# Patient Record
Sex: FEMALE | Race: WHITE | NOT HISPANIC OR LATINO | Employment: UNEMPLOYED | URBAN - METROPOLITAN AREA
[De-identification: names, ages, dates, MRNs, and addresses within clinical notes are randomized per-mention and may not be internally consistent; named-entity substitution may affect disease eponyms.]

---
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
## 2019-01-21 RX ORDER — TOBRAMYCIN 0.3 %
OINTMENT (GRAM) OPHTHALMIC (EYE) DAILY
Refills: 0 | COMMUNITY
Start: 2018-12-24 | End: 2019-03-01 | Stop reason: ALTCHOICE

## 2019-01-21 NOTE — PROGRESS NOTES
[facility] [facility]  PATIENT:  Shea Curry  MRN:  [id number]  :  2000  DATE OF SERVICE:  2019      Assessment/Plan:        Problem List Items Addressed This Visit        Cardiovascular and Mediastinum    Chronic migraine without aura without status migrainosus, not intractable    Relevant Medications    topiramate (TOPAMAX) 100 mg tablet       Nervous and Auditory    Intracranial hypertension - Primary          Intracranial hypertension  Acetazolamide 750 mg three times a day  - Goal for weight loss    Chronic migraine headaches:   Preventive:   - Consider adding Lamictal for mood and headache  - Cyproheptadine for weather headaches  You take this before bed to prevent a headache the next day  Use as needed  - Topamax 150 mg at bedtime for 7 days then 200 mg after that    Geo Ricketts therapy:   - cambia at onset        History of Present Illness: We had the pleasure of evaluating Shea Curry in neurological follow up today  As you know she is a 22year old right handed female  Currently in community college doing her prerequisite, and will be going to [employer] program in the fall  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    PREVENTIVE: Topamax (which she does not think it really helped), Protriptyline, Propranolol, diamox  ABORTIVE: Motrin which does help, has tried Excedrin, Tylenol, Reglan, Toradol, cambia, indomethacin, ibuprofen (almost daily)    Headache trigger: Stress, Menstruation, Sunlight, Loud noises    Current pain level- 5-6/10    How often do the headaches occur -  Mild headaches: 5 a month  Severe headaches:  2-5 a month or less    What time of the day do the headaches start - sometime she wakes up with them or by mid day she will get it  How long do the headaches last - usually all day    Where are they located - bilateral temporal, bilateral occipital    What is the intensity of pain -   Mild headaches: average pain level 3-5/10, up to 8-10/10  Describe your usual headache - Throbbing, Pressure, Aching    Associated symptoms:  -  Blurry vision  - Nausea and vomiting  - Photophobia, phonophobia, blurred vision  - flushing of face bilaterally  - light-headed or dizzy, stiff or sore neck,   -  prefer to be alone and in a dark room                01/08/2016 MRI head without contrast:   IMPRESSION-   1  Given headaches in the context of slit-like appearance of the ventricles and patient's BMI of 29, consideration should be given to early idiopathic intracranial hypertension  Ophthalmology consultation, MRV, and possibly lumbar puncture may be performed if clinically indicated  2  Non-specific single small left anterior frontal focus of T2 signal hyperintensity  3  Small bilateral maxillary sinus mucosal retention cysts      Personally reviewed her MRI head    Past Medical History:   Diagnosis Date    Dysmenorrhea     Last Assessed:6/26/17    Functional ovarian cysts     Menorrhagia with regular cycle     Migraine     Ovarian cyst     Last Assessed:2/17/15       Patient Active Problem List   Diagnosis    Functional ovarian cysts    Intracranial hypertension    Chronic migraine without aura without status migrainosus, not intractable       Medications:      Current Outpatient Prescriptions   Medication Sig Dispense Refill    acetaZOLAMIDE (DIAMOX) 250 mg tablet Take 3 tablets (750 mg total) by mouth 3 (three) times a day 3 tabs in am, 3 tabs mid day and 2 tabs at bedtime 270 tablet 5    Diclofenac Potassium (CAMBIA) 50 MG PACK Take 50 mg by mouth as needed        norethindrone-ethinyl estradiol (JUNEL FE 1/20) 1-20 MG-MCG per tablet Take 1 tablet by mouth daily 90 tablet 3    topiramate (TOPAMAX) 100 mg tablet One and half at bedtime for 7 days then two tabs at bedtime  60 tablet 3    tretinoin (RETIN-A) 0 025 % cream Apply topically daily as needed        doxycycline monohydrate (MONODOX) 100 mg capsule Take 1 capsule by mouth Daily      fluticasone (FLONASE) 50 mcg/act nasal spray 1 spray into each nostril 2 (two) times a day      TOBREX 0 3 % OINT Apply to eye daily  0     No current facility-administered medications for this visit           Allergies:    No Known Allergies    Family History:     Family History   Problem Relation Age of Onset    Bipolar disorder Mother     Diverticulitis Mother         Of intestines without peroration or abscess without bleeding unspecified part of intestinal tract    Other Mother         Emotional Disorder    Cerebral aneurysm Mother     AKILA disease Mother     Irritable bowel syndrome Mother     Anxiety disorder Paternal Grandfather     Depression Paternal Grandfather     Other Other         Brain Tumor    Drug abuse Family     Alcohol abuse Family     Stroke Family         CVA    Hypertension Family     Breast cancer Family     Obesity Family         Overweight    Diabetes Other     Hypertension Other     Colon cancer Other     Hodgkin's lymphoma Other     Hypertension Other     Colon cancer Other     Uterine cancer Other     Colon cancer Father        Social History:     Social History     Social History    Marital status: Single     Spouse name: N/A    Number of children: N/A    Years of education: N/A     Occupational History    Part time      Social History Main Topics    Smoking status: Never Smoker    Smokeless tobacco: Never Used    Alcohol use No    Drug use: No    Sexual activity: Yes     Partners: Male     Birth control/ protection: OCP     Other Topics Concern    Not on file     Social History Narrative    Always uses seatbelt    Denied caffeine use    College Student    Moderate exercise: less than 3 times per week    Feels safe at home             Objective:   Physical Exam:                                                                   Vitals:            Constitutional:    /76 (BP Location: Left arm, Patient Position: Sitting, Cuff Size: Standard)   Pulse 96   Ht 5' 4" (1 626 m)   Wt 88 9 kg (196 lb)   BMI 33 64 kg/m²   BP Readings from Last 3 Encounters:   01/25/19 108/76   12/04/18 126/58   11/20/18 116/82     Pulse Readings from Last 3 Encounters:   01/25/19 96   12/04/18 86   11/20/18 80         Well developed, well nourished, well groomed  No dysmorphic features  CONSTITUTIONAL: Well developed, well nourished, well groomed  No dysmorphic features  Eyes:  PERRLA, EOM normal      Neck:  Normal ROM, neck supple  HEENT:  Normocephalic atraumatic  No meningismus  Oropharynx is clear and moist  No oral mucosal lesions  Chest:  Respirations regular and unlabored  Cardiovascular:  Distal extremities warm without palpable edema or tenderness, no observed significant swelling  Musculoskeletal:  Full range of motion  Wearing a boot on left foot  (see below under neurologic exam for evaluation of motor function and gait)   Skin:  warm and dry   Psychiatric:  Normal behavior and appropriate affect      Neurological Examination:   Mental status/cognitive function: Orientated to time, place and person  Cranial Nerves: 2 to 12 intact  Motor Exam:  5/5 upper and lower extremity  Sensory: grossly intact light touch in all extremities  Reflexes: 2/4 throughout  Coordination: Finger nose finger intact bilaterally, no tremor noted  Gait: steady casual and tandem gait  Romberg Negative  Review of Systems:   Review of Systems   Constitutional: Positive for fatigue  HENT: Negative  Eyes: Positive for visual disturbance  Respiratory: Negative  Cardiovascular: Negative  Gastrointestinal: Negative  Endocrine: Negative  Genitourinary: Negative  Musculoskeletal: Positive for neck pain  Skin: Negative  Allergic/Immunologic: Negative  Neurological: Positive for headaches  Tingling    Hematological: Negative  Psychiatric/Behavioral: Positive for sleep disturbance (Trouble falling and staying asleep)         I personally reviewed and updated the ROS that was entered by the medical assistant     Author:  Augusto Dickinson MD 1/25/2019 4:13 PM

## 2019-01-25 ENCOUNTER — OFFICE VISIT (OUTPATIENT)
Dept: NEUROLOGY | Facility: CLINIC | Age: 19
End: 2019-01-25
Payer: COMMERCIAL

## 2019-01-25 VITALS
HEIGHT: 64 IN | DIASTOLIC BLOOD PRESSURE: 76 MMHG | BODY MASS INDEX: 33.46 KG/M2 | WEIGHT: 196 LBS | HEART RATE: 96 BPM | SYSTOLIC BLOOD PRESSURE: 108 MMHG

## 2019-01-25 DIAGNOSIS — G43.709 CHRONIC MIGRAINE WITHOUT AURA WITHOUT STATUS MIGRAINOSUS, NOT INTRACTABLE: ICD-10-CM

## 2019-01-25 DIAGNOSIS — G93.2 INTRACRANIAL HYPERTENSION: Primary | ICD-10-CM

## 2019-01-25 PROCEDURE — 99214 OFFICE O/P EST MOD 30 MIN: CPT | Performed by: PSYCHIATRY & NEUROLOGY

## 2019-01-25 RX ORDER — TOPIRAMATE 100 MG/1
TABLET, FILM COATED ORAL
Qty: 60 TABLET | Refills: 3 | Status: SHIPPED | OUTPATIENT
Start: 2019-01-25 | End: 2019-07-24 | Stop reason: SDUPTHER

## 2019-01-25 NOTE — PATIENT INSTRUCTIONS
Intracranial hypertension  Acetazolamide 750 mg three times a day  - Goal for weight loss    Chronic migraine headaches:   Preventive:   - Consider adding Lamictal for mood and headache  - Cyproheptadine for weather headaches  You take this before bed to prevent a headache the next day   Use as needed  - Topamax 150 mg at bedtime for 7 days then 200 mg after that    Elba Mode therapy:   - cambia at onset

## 2019-03-01 ENCOUNTER — OFFICE VISIT (OUTPATIENT)
Dept: OBGYN CLINIC | Facility: CLINIC | Age: 19
End: 2019-03-01
Payer: COMMERCIAL

## 2019-03-01 VITALS — DIASTOLIC BLOOD PRESSURE: 72 MMHG | SYSTOLIC BLOOD PRESSURE: 120 MMHG | BODY MASS INDEX: 32.27 KG/M2 | WEIGHT: 188 LBS

## 2019-03-01 DIAGNOSIS — Z30.09 BIRTH CONTROL COUNSELING: Primary | ICD-10-CM

## 2019-03-01 PROCEDURE — 99213 OFFICE O/P EST LOW 20 MIN: CPT | Performed by: PHYSICIAN ASSISTANT

## 2019-03-01 RX ORDER — LAMOTRIGINE 25 MG/1
50 TABLET ORAL DAILY
Refills: 0 | COMMUNITY
Start: 2019-02-22 | End: 2021-03-29

## 2019-03-01 NOTE — PROGRESS NOTES
Keren Donato  2000      CC:  Birth control consultation    S:  22 y o  female here for birth control discussion  She is currently on Junel Fe 1/20 and had been on Topamax but is now being started on Lamictal as well  She is not using condoms consistently and she requests more effective contraception  We reviewed the risks and benefits of IUD contraception in detail today and she is interested in a [facility]  Current Outpatient Medications:     acetaZOLAMIDE (DIAMOX) 250 mg tablet, Take 3 tablets (750 mg total) by mouth 3 (three) times a day 3 tabs in am, 3 tabs mid day and 2 tabs at bedtime, Disp: 270 tablet, Rfl: 5    Diclofenac Potassium (CAMBIA) 50 MG PACK, Take 50 mg by mouth as needed  , Disp: , Rfl:     doxycycline monohydrate (MONODOX) 100 mg capsule, Take 1 capsule by mouth Daily, Disp: , Rfl:     fluticasone (FLONASE) 50 mcg/act nasal spray, 1 spray into each nostril 2 (two) times a day, Disp: , Rfl:     lamoTRIgine (LaMICtal) 25 mg tablet, Take 50 mg by mouth daily, Disp: , Rfl: 0    norethindrone-ethinyl estradiol (JUNEL FE 1/20) 1-20 MG-MCG per tablet, Take 1 tablet by mouth daily, Disp: 90 tablet, Rfl: 3    topiramate (TOPAMAX) 100 mg tablet, One and half at bedtime for 7 days then two tabs at bedtime  , Disp: 60 tablet, Rfl: 3    tretinoin (RETIN-A) 0 025 % cream, Apply topically daily as needed  , Disp: , Rfl:   Social History     Socioeconomic History    Marital status: Single     Spouse name: Not on file    Number of children: Not on file    Years of education: Not on file    Highest education level: Not on file   Occupational History    Occupation: Part time   Social Needs    Financial resource strain: Not on file    Food insecurity:     Worry: Not on file     Inability: Not on file   [employer] needs:     Medical: Not on file     Non-medical: Not on file   Tobacco Use    Smoking status: Never Smoker    Smokeless tobacco: Never Used   Substance and Sexual Activity    Alcohol use: No    Drug use: No    Sexual activity: Yes     Partners: Male     Birth control/protection: OCP   Lifestyle    Physical activity:     Days per week: Not on file     Minutes per session: Not on file    Stress: Not on file   Relationships    Social connections:     Talks on phone: Not on file     Gets together: Not on file     Attends Anabaptism service: Not on file     Active member of club or organization: Not on file     Attends meetings of clubs or organizations: Not on file     Relationship status: Not on file    Intimate partner violence:     Fear of current or ex partner: Not on file     Emotionally abused: Not on file     Physically abused: Not on file     Forced sexual activity: Not on file   Other Topics Concern    Not on file   Social History Narrative    Always uses seatbelt    Denied caffeine use    College Student    Moderate exercise: less than 3 times per week    Feels safe at home     Family History   Problem Relation Age of Onset    Bipolar disorder Mother     Diverticulitis Mother         Of intestines without peroration or abscess without bleeding unspecified part of intestinal tract    Other Mother         Emotional Disorder    Cerebral aneurysm Mother     AKILA disease Mother     Irritable bowel syndrome Mother     Anxiety disorder Paternal Grandfather     Depression Paternal Grandfather     Other Other         Brain Tumor    Drug abuse Family     Alcohol abuse Family     Stroke Family         CVA    Hypertension Family     Breast cancer Family     Obesity Family         Overweight    Diabetes Other     Hypertension Other     Colon cancer Other     Hodgkin's lymphoma Other     Hypertension Other     Colon cancer Other     Uterine cancer Other     Colon cancer Father      Past Medical History:   Diagnosis Date    Dysmenorrhea     Last Assessed:6/26/17    Functional ovarian cysts     Menorrhagia with regular cycle     Migraine     Ovarian cyst     Last Assessed:2/17/15       O:   Blood pressure 120/72, weight 85 3 kg (188 lb), last menstrual period 02/19/2019  A:  Contraception/birth control    P:  We will check Kyleena coverage and contact her to schedule

## 2019-03-06 ENCOUNTER — TELEPHONE (OUTPATIENT)
Dept: OBGYN CLINIC | Facility: CLINIC | Age: 19
End: 2019-03-06

## 2019-03-06 NOTE — TELEPHONE ENCOUNTER
Called [employer] [facility] to verify Julieta Mon insertion, iud is cover 100% no capay, no deductible, Ref# [id number]  LMOM to call us back to schedule Kyleena insertion  Thank you

## 2019-03-07 ENCOUNTER — TELEPHONE (OUTPATIENT)
Dept: OBGYN CLINIC | Facility: CLINIC | Age: 19
End: 2019-03-07

## 2019-03-07 NOTE — TELEPHONE ENCOUNTER
No birth control pills, patch or ring will be effective with the other meds  Increasing the dose will not change things  If she doesn't want an IUD (which would work), she could use condoms consistently in addition to her current pill

## 2019-03-07 NOTE — TELEPHONE ENCOUNTER
Pt does not want iud - will consistently use condoms - spermacide     Re iterated - do not want to become pregnant on these meds

## 2019-03-07 NOTE — TELEPHONE ENCOUNTER
Pt on Lamictal and Topomax - afraid oc"s will not be effective with these meds  She has decided she does not want and iud  What to do?   Thx  Is on Junel 1/20

## 2019-03-07 NOTE — TELEPHONE ENCOUNTER
Patient recently saw Makayla Lutz for a birth control consult  At first patient wanted to get the IUD but now she has decided against it  Patient was wondering if there  Is a higher dose of the birth control pill she can take instead

## 2019-04-24 NOTE — PROGRESS NOTES
Patient ID: Johana Box is a 22 y o  female  Assessment/Plan:    Intracranial hypertension  We will obtain an LP for your sudden increase in headaches  Please hold your Acetazolamide for 3 days before your LP-resume later that day after your procedure  Call with worsening headaches    Continue with the Acetazolamide and Topiramate for prevention  Use the Cambia to abort the headache  Use only 2 times a week or 9 times a month  Use the Cyproheptadine for weather headaches  You take this before bed to prevent a headache the next day  Use as needed    Needs to see opthalmology ASAP    Call us if worsens  May need to consider repeat MRI with MRV if no improvement to rule out additional secondary causes         Problem List Items Addressed This Visit        Nervous and Auditory    Intracranial hypertension - Primary     We will obtain an LP for your sudden increase in headaches  Please hold your Acetazolamide for 3 days before your LP-resume later that day after your procedure  Call with worsening headaches    Continue with the Acetazolamide and Topiramate for prevention  Use the Cambia to abort the headache  Use only 2 times a week or 9 times a month  Use the Cyproheptadine for weather headaches  You take this before bed to prevent a headache the next day  Use as needed    Needs to see opthalmology ASAP    Call us if worsens  May need to consider repeat MRI with MRV if no improvement to rule out additional secondary causes         Relevant Orders    FL lumbar puncture             Subjective:    HPI  We had the pleasure of evaluating Johana Box in neurological follow up today  As you know she is a 22year old right handed female  Is at [employer] during her prerequists, and will be going to [employer] program in the fall  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 PREVENTIVE: Topamax (which she does not think it really helped), Protriptyline, Propranolol, diamox  ABORTIVE: Motrin which does help, has tried Excedrin, Tylenol, reglan, toradol, cambia, indomethacin, ibuprofen (almost daily)  Headache trigger: Stress, Menstruation, Sunlight, Loud noises  Since last seen headaches have improved  Current pain level- 5-6/10  How often do the headaches occur - >15 a month  What time of the day do the headaches start - wakes up with them and worsens as sits up and then get worse throughout the day  (afternoon)  How long do the headaches last - usually all day  Where are they located - bilateral temporal, bilateral occipital  What is the intensity of pain - average pain level 3-4/10, up to 8-10/10  Describe your usual headache - Throbbing, Pressure, Aching  Associated symptoms:   Photophobia, phonophobia, blurred vision   Decrease of appetite, nausea, vomiting,    flushing of face bilaterally, light-headed or dizzy, stiff or sore neck,    prefer to be alone and in a dark room    Headache is worse if the patient: cough, sneeze, bending over   Does get tunnel vision if she bends her head forward and looks down    Complains of some random hand numbness intermittent    Goes to bed at 12:30-1 am but usually has difficulty falling asleep until 230-3 am   Gets up at 10:30 am           The following portions of the patient's history were reviewed and updated as appropriate: allergies, current medications, past family history, past medical history, past social history, past surgical history and problem list          Objective:    Blood pressure 116/82, pulse 80, height 5' 4" (1 626 m), weight 91 6 kg (202 lb)  Physical Exam   Constitutional: She appears well-developed and well-nourished  Morbidly obese   HENT:   Head: Normocephalic and atraumatic  Eyes: Pupils are equal, round, and reactive to light  Lids are normal    Neck: Normal range of motion  Cardiovascular: Normal rate  Pulmonary/Chest: Effort normal    Musculoskeletal: Normal range of motion  Neurological: She has normal strength and normal reflexes  Coordination normal    Skin: Skin is warm and dry  Psychiatric: She has a normal mood and affect  Her speech is normal    Nursing note and vitals reviewed  Neurological Exam  Mental Status   Oriented to person, place, time and situation  Recent and remote memory are intact  Speech is normal  Language is fluent with no aphasia  Attention and concentration are normal     Cranial Nerves  CN II: Visual acuity is normal  Visual fields full to confrontation  CN III, IV, VI: Extraocular movements intact bilaterally  Normal lids and orbits bilaterally  Pupils equal round and reactive to light bilaterally  CN V: Facial sensation is normal   CN VII: Full and symmetric facial movement  CN VIII: Hearing is normal   CN IX, X: Palate elevates symmetrically  Normal gag reflex  CN XI: Shoulder shrug strength is normal   CN XII: Tongue midline without atrophy or fasciculations  Motor  Normal muscle bulk throughout  No fasciculations present  Normal muscle tone  Strength is 5/5 throughout all four extremities  Sensory  Sensation is intact to light touch, pinprick, vibration and proprioception in all four extremities  Reflexes  Deep tendon reflexes are 2+ and symmetric in all four extremities with downgoing toes bilaterally      Coordination  Finger-to-nose, rapid alternating movements and heel-to-shin normal bilaterally without dysmetria  Gait  Normal casual, toe, heel and tandem gait  ROS:    Review of Systems  Constitutional: Negative for activity change, appetite change, chills, diaphoresis, fatigue, fever and unexpected weight change  HENT: Negative for congestion, dental problem, drooling, ear discharge, ear pain, facial swelling, hearing loss, mouth sores, nosebleeds, postnasal drip, rhinorrhea, sinus pain, sinus pressure, sneezing, sore throat, tinnitus, trouble swallowing and voice change  Eyes: Positive for visual disturbance  Negative for photophobia, pain, discharge, redness and itching  Respiratory: Negative for apnea, cough, choking, chest tightness, shortness of breath, wheezing and stridor  Cardiovascular: Negative for chest pain, palpitations and leg swelling  Gastrointestinal: Negative for abdominal distention, abdominal pain, anal bleeding, blood in stool, constipation, diarrhea, nausea, rectal pain and vomiting  Endocrine: Negative for cold intolerance, heat intolerance, polydipsia, polyphagia and polyuria  Genitourinary: Negative for decreased urine volume, difficulty urinating, dyspareunia, dysuria, enuresis, flank pain, frequency, genital sores, hematuria, menstrual problem, pelvic pain, urgency, vaginal bleeding, vaginal discharge and vaginal pain  Musculoskeletal: Negative for arthralgias, back pain, gait problem, joint swelling, myalgias, neck pain and neck stiffness  Skin: Negative for color change, pallor, rash and wound  Allergic/Immunologic: Negative for environmental allergies, food allergies and immunocompromised state  Neurological: Positive for dizziness, light-headedness, numbness (both hands) and headaches  Negative for tremors, seizures, syncope, facial asymmetry, speech difficulty and weakness  Hematological: Negative for adenopathy  Does not bruise/bleed easily  Psychiatric/Behavioral: Positive for sleep disturbance   Negative for agitation, behavioral problems, confusion, decreased concentration, dysphoric mood, hallucinations, self-injury and suicidal ideas   The patient is not nervous/anxious and is not hyperactive       Island Pedicle Flap With Canthal Suspension Text: The defect edges were debeveled with a #15 scalpel blade.  Given the location of the defect, shape of the defect and the proximity to free margins an island pedicle advancement flap was deemed most appropriate.  Using a sterile surgical marker, an appropriate advancement flap was drawn incorporating the defect, outlining the appropriate donor tissue and placing the expected incisions within the relaxed skin tension lines where possible. The area thus outlined was incised deep to adipose tissue with a #15 scalpel blade.  The skin margins were undermined to an appropriate distance in all directions around the primary defect and laterally outward around the island pedicle utilizing iris scissors.  There was minimal undermining beneath the pedicle flap. A suspension suture was placed in the canthal tendon to prevent tension and prevent ectropion.

## 2019-05-01 ENCOUNTER — OFFICE VISIT (OUTPATIENT)
Dept: NEUROLOGY | Facility: CLINIC | Age: 19
End: 2019-05-01
Payer: COMMERCIAL

## 2019-05-01 VITALS
BODY MASS INDEX: 32.03 KG/M2 | SYSTOLIC BLOOD PRESSURE: 116 MMHG | DIASTOLIC BLOOD PRESSURE: 67 MMHG | HEIGHT: 64 IN | HEART RATE: 96 BPM | WEIGHT: 187.6 LBS

## 2019-05-01 DIAGNOSIS — G43.709 CHRONIC MIGRAINE WITHOUT AURA WITHOUT STATUS MIGRAINOSUS, NOT INTRACTABLE: Primary | ICD-10-CM

## 2019-05-01 DIAGNOSIS — G93.2 INTRACRANIAL HYPERTENSION: ICD-10-CM

## 2019-05-01 PROCEDURE — 99215 OFFICE O/P EST HI 40 MIN: CPT | Performed by: PHYSICIAN ASSISTANT

## 2019-05-01 RX ORDER — PROCHLORPERAZINE MALEATE 10 MG
10 TABLET ORAL EVERY 6 HOURS PRN
Qty: 10 TABLET | Refills: 0 | Status: SHIPPED | OUTPATIENT
Start: 2019-05-01

## 2019-05-01 RX ORDER — LOXAPINE SUCCINATE 5 MG/1
TABLET ORAL
Refills: 0 | COMMUNITY
Start: 2019-04-19 | End: 2021-03-16 | Stop reason: ALTCHOICE

## 2019-05-01 RX ORDER — NORETHINDRONE ACETATE AND ETHINYL ESTRADIOL AND FERROUS FUMARATE 1MG-20(24)
1 KIT ORAL
COMMUNITY
Start: 2018-06-06 | End: 2019-06-27

## 2019-05-01 RX ORDER — LAMOTRIGINE 100 MG/1
100 TABLET ORAL DAILY
Refills: 0 | COMMUNITY
Start: 2019-04-20

## 2019-06-04 ENCOUNTER — TELEPHONE (OUTPATIENT)
Dept: NEUROLOGY | Facility: CLINIC | Age: 19
End: 2019-06-04

## 2019-06-05 ENCOUNTER — TELEPHONE (OUTPATIENT)
Dept: FAMILY MEDICINE CLINIC | Facility: CLINIC | Age: 19
End: 2019-06-05

## 2019-06-10 ENCOUNTER — CLINICAL SUPPORT (OUTPATIENT)
Dept: FAMILY MEDICINE CLINIC | Facility: CLINIC | Age: 19
End: 2019-06-10
Payer: COMMERCIAL

## 2019-06-10 DIAGNOSIS — Z11.1 PPD SCREENING TEST: Primary | ICD-10-CM

## 2019-06-10 PROCEDURE — 86580 TB INTRADERMAL TEST: CPT

## 2019-06-12 ENCOUNTER — CLINICAL SUPPORT (OUTPATIENT)
Dept: FAMILY MEDICINE CLINIC | Facility: CLINIC | Age: 19
End: 2019-06-12

## 2019-06-12 DIAGNOSIS — Z11.1 PPD SCREENING TEST: Primary | ICD-10-CM

## 2019-06-12 LAB
INDURATION: 0 MM
TB SKIN TEST: NEGATIVE

## 2019-06-12 PROCEDURE — RECHECK

## 2019-06-24 ENCOUNTER — TELEPHONE (OUTPATIENT)
Dept: NEUROLOGY | Facility: CLINIC | Age: 19
End: 2019-06-24

## 2019-06-24 NOTE — TELEPHONE ENCOUNTER
Patient does not have migraine headaches with aura, so I do not think that should be a problem    Thank you

## 2019-06-24 NOTE — TELEPHONE ENCOUNTER
Pt called stated her gyn advised her to call our office and make her aware that she was placed on Ortho Tri Cyclen and that this can affect her migraines

## 2019-06-27 ENCOUNTER — OFFICE VISIT (OUTPATIENT)
Dept: FAMILY MEDICINE CLINIC | Facility: CLINIC | Age: 19
End: 2019-06-27
Payer: COMMERCIAL

## 2019-06-27 VITALS
WEIGHT: 190.6 LBS | BODY MASS INDEX: 31.75 KG/M2 | DIASTOLIC BLOOD PRESSURE: 76 MMHG | RESPIRATION RATE: 16 BRPM | HEART RATE: 102 BPM | OXYGEN SATURATION: 99 % | HEIGHT: 65 IN | SYSTOLIC BLOOD PRESSURE: 124 MMHG | TEMPERATURE: 97.5 F

## 2019-06-27 DIAGNOSIS — Z01.84 IMMUNITY TO VARICELLA DETERMINED BY SEROLOGIC TEST: ICD-10-CM

## 2019-06-27 DIAGNOSIS — G43.709 CHRONIC MIGRAINE WITHOUT AURA WITHOUT STATUS MIGRAINOSUS, NOT INTRACTABLE: ICD-10-CM

## 2019-06-27 DIAGNOSIS — N83.299 FUNCTIONAL OVARIAN CYSTS: ICD-10-CM

## 2019-06-27 DIAGNOSIS — Z00.00 WELLNESS EXAMINATION: Primary | ICD-10-CM

## 2019-06-27 PROCEDURE — 99395 PREV VISIT EST AGE 18-39: CPT | Performed by: FAMILY MEDICINE

## 2019-06-27 RX ORDER — NORGESTIMATE AND ETHINYL ESTRADIOL 7DAYSX3 28
1 KIT ORAL DAILY
Refills: 0 | COMMUNITY
Start: 2019-06-19

## 2019-07-17 ENCOUNTER — CLINICAL SUPPORT (OUTPATIENT)
Dept: FAMILY MEDICINE CLINIC | Facility: CLINIC | Age: 19
End: 2019-07-17
Payer: COMMERCIAL

## 2019-07-17 DIAGNOSIS — Z11.1 SCREENING FOR TUBERCULOSIS: Primary | ICD-10-CM

## 2019-07-17 PROCEDURE — 86580 TB INTRADERMAL TEST: CPT

## 2019-07-17 NOTE — PROGRESS NOTES
Assessment/Plan:    No problem-specific Assessment & Plan notes found for this encounter  There are no diagnoses linked to this encounter  Subjective:      Patient ID: Yaima Lua is a 20 y o  female  HPI    Patient here for PPD to be applied     Review of Systems      Objective: There were no vitals taken for this visit           Physical Exam

## 2019-07-19 ENCOUNTER — CLINICAL SUPPORT (OUTPATIENT)
Dept: FAMILY MEDICINE CLINIC | Facility: CLINIC | Age: 19
End: 2019-07-19

## 2019-07-19 DIAGNOSIS — Z11.1 ENCOUNTER FOR PPD TEST: Primary | ICD-10-CM

## 2019-07-19 LAB
INDURATION: 0 MM
TB SKIN TEST: NEGATIVE

## 2019-07-19 PROCEDURE — RECHECK

## 2019-07-19 NOTE — PROGRESS NOTES
Assessment/Plan:    No problem-specific Assessment & Plan notes found for this encounter  Diagnoses and all orders for this visit:    Encounter for PPD test          Subjective:      Patient ID: Cristal Abel is a 20 y o  female  HPI          Objective: There were no vitals taken for this visit           Physical Exam

## 2019-07-24 DIAGNOSIS — G43.709 CHRONIC MIGRAINE WITHOUT AURA WITHOUT STATUS MIGRAINOSUS, NOT INTRACTABLE: ICD-10-CM

## 2019-07-24 RX ORDER — TOPIRAMATE 100 MG/1
TABLET, FILM COATED ORAL
Qty: 60 TABLET | Refills: 6 | Status: SHIPPED | OUTPATIENT
Start: 2019-07-24 | End: 2020-03-09

## 2019-07-24 NOTE — TELEPHONE ENCOUNTER
Patient called to check on the status of this prescription refill  Currently taking 2 tablets at bedtime  Please sign off if agreeable  Thank you

## 2020-01-22 ENCOUNTER — SOCIAL WORK (OUTPATIENT)
Dept: BEHAVIORAL/MENTAL HEALTH CLINIC | Facility: CLINIC | Age: 20
End: 2020-01-22
Payer: COMMERCIAL

## 2020-01-22 DIAGNOSIS — F39 MOOD DISORDER (HCC): Primary | ICD-10-CM

## 2020-01-22 PROCEDURE — 90791 PSYCH DIAGNOSTIC EVALUATION: CPT | Performed by: SOCIAL WORKER

## 2020-01-27 PROBLEM — F39 MOOD DISORDER (HCC): Status: ACTIVE | Noted: 2020-01-27

## 2020-01-27 NOTE — PSYCH
Assessment/Plan:      Diagnoses and all orders for this visit:    Mood disorder ([facility] [address] )          Subjective:      Patient ID: Marilyn Bhatti is a 20 y o  female  HPI:     Pre-morbid level of function and History of Present Illness: Elvis Yates is returning to treatment due to issues with depression and anxiety  She stated that she recently went to psychiatrist Dr Monique David who diagnosed her with Bipolar Disorder  She stated that she has been struggling with depression and had an episode where she could not stop crying and her mother was about to take her to the nearest emergency room  She stated that she was able to calm down and realized that she needed hep  She stated that she often feels like a "failure"  She stated that 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  She stated that she is now in a different program  She also stated that 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  She stated that she has been experiencing mood swings, low energy, sleep difficulties, increased worry, and repetitive thoughts  Previous Psychiatric/psychological treatment/year: [facility]  Current Psychiatrist/Therapist: Dr Santi Tamayo  Outpatient and/or Partial and Other Community Resources Used (CTT, ICM, VNA): Outpatient Psychiatry and therapy        Problem Assessment:     SOCIAL/VOCATION:  Family Constellation (include parents, relationship with each and pertinent Psych/Medical History):     Family History   Problem Relation Age of Onset    Bipolar disorder Mother     Diverticulitis Mother         Of intestines without peroration or abscess without bleeding unspecified part of intestinal tract    Other Mother         Emotional Disorder    Cerebral aneurysm Mother     AKILA disease Mother     Irritable bowel syndrome Mother     Anxiety disorder Paternal Grandfather     Depression Paternal Grandfather     Other Other         Brain Tumor    Drug abuse Family     Alcohol abuse Family     Stroke Family         CVA    Hypertension Family     Breast cancer Family     Obesity Family         Overweight    Diabetes Other     Hypertension Other     Colon cancer Other     Hodgkin's lymphoma Other     Hypertension Other     Colon cancer Other     Uterine cancer Other     Colon cancer Father        Mother: Lives with-good relationship  Spouse: Fiance-lives in [place]   Father: Minimal relationship   Children: None   Sibling: 3 brothers-one lives at home-good relationships   Sibling:    Children:    Other:     Mendoza Reyes relates best to her fiance and family  she lives with her mother and brother  she does not live alone  Domestic Violence: No past history of domestic violence and There is no history of child abuse    Additional Comments related to family/relationships/peer support:  Mamie currently lives with her mother and brother in [facility]  She shared that she grew up in the [facility] where her father was a , She stated that her father had an affair and left the family  She stated that her mother moved her and her brother to the [facility]  She stated that her oldest two brothers are  with families  Her mother was remarried for a short period of time but is now   She shared that she recently became engaged over the holidays  She stated that her fiance lives in [place] and that they take turns visiting one another  She stated that upon her graduation she will be moving there to join him        School or Work History (strengths/limitations/needs):  Currently in [employer]  Her highest grade level achieved was  HS Diploma     history includes N/A    Financial status includes Stable    LEISURE ASSESSMENT (Include past and present hobbies/interests and level of involvement (Ex: Group/Club Affiliations): Neelima Pierre enjoys time with her dog and her fiance  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 No    FUNCTIONAL STATUS: There has been a recent change in June ability to do the following: None    Level of Assistance Needed/By Whom?: N/A    June learns best by  reading, listening and demostration    SUBSTANCE ABUSE ASSESSMENT: no substance abuse    Substance/Route/Age/Amount/Frequency/Last Use: N/A    DETOX HISTORY: N/A    Previous detox/rehab treatment: N/A    HEALTH ASSESSMENT: has not gained 10 lbs or more in the last 6 months without trying, no nausea, no vomiting and no referral to PCP needed    LEGAL: No Mental Health Advance Directive or Power of  on file    Prenatal History: N/A    Delivery History: N/A    Developmental Milestones: N/A  Temperament as an infant was N/A  Temperament as a toddler was N/A  Temperament at school age was N/A  Temperament as a teenager was N/A  Risk Assessment:   The following ratings are based on my interview(s) with Mamie    Risk of Harm to Self:   Demographic risk factors include , never  or  status and age: young adult (15-24)  Historical Risk Factors include chronic psychiatric problems  Recent Specific Risk Factors include diagnosis of depression   Additional Factors for a Child or Adolescent N/A    Risk of Harm to Others:   Demographic Risk Factors include [age] years of age  Historical Risk Factors include None  Recent Specific Risk Factors include None    Access to Weapons:   Fabienne Mccarthy has access to the following weapons: None   The following steps have been taken to ensure weapons are properly secured: N/A    Based on the above information, the client presents the following risk of harm to self or others:  None    The following interventions are recommended:   no intervention changes    Notes regarding this Risk Assessment: None        Review Of Systems:     Mood Anxiety and Depression   Behavior Normal    Thought Content Normal   General Sleep Disturbances   Personality Normal   Other Psych Symptoms Normal   Constitutional Normal   ENT Normal   Cardiovascular Normal    Respiratory Normal    Gastrointestinal Normal   Genitourinary Normal    Musculoskeletal Negative   Integumentary Normal    Neurological Migraines   Endocrine Normal          Mental status:  Appearance calm and cooperative , adequate hygiene and grooming and good eye contact    Mood depressed   Affect affect was tearful   Speech a normal rate   Thought Processes normal thought processes   Hallucinations no hallucinations present    Thought Content no delusions   Abnormal Thoughts no suicidal thoughts  and no homicidal thoughts    Orientation  oriented to person and place and time   Remote Memory short term memory intact and long term memory intact   Attention Span concentration intact   Intellect Appears to be of Average Intelligence   Fund of Knowledge displays adequate knowledge of current events, adequate fund of knowledge regarding past history and adequate fund of knowledge regarding vocabulary    Insight Insight intact   Judgement judgment was intact   Muscle Strength Muscle strength and tone were normal and Normal gait    Language no difficulty naming common objects, no difficulty repeating a phrase  and no difficulty writing a sentence    Pain none   Pain Scale 0

## 2020-01-27 NOTE — PSYCH
Treatment Plan Tracking    # 1Treatment Plan not completed within required time limits due to: Treatment plan not completed due to interview running over time

## 2020-02-26 ENCOUNTER — SOCIAL WORK (OUTPATIENT)
Dept: BEHAVIORAL/MENTAL HEALTH CLINIC | Facility: CLINIC | Age: 20
End: 2020-02-26
Payer: COMMERCIAL

## 2020-02-26 DIAGNOSIS — F39 MOOD DISORDER (HCC): Primary | ICD-10-CM

## 2020-02-26 PROCEDURE — 90834 PSYTX W PT 45 MINUTES: CPT | Performed by: SOCIAL WORKER

## 2020-02-26 PROCEDURE — 1036F TOBACCO NON-USER: CPT | Performed by: SOCIAL WORKER

## 2020-02-26 NOTE — PSYCH
Psychotherapy Provided: Individual Psychotherapy 45 minutes     Length of time in session: 45 minutes, follow up in 1 month    Goals addressed in session: Goal 1     Pain:      none    0    Current suicide risk : Low     SHANIKA Hatch stated that she recently found out that her fiance had been cheating on her  She stated that his friend contacted her and told that this was happening  She stated that when she confronted him he admitted to this  He then also told her that he did not love her anymore  She stated that this was a very difficult time for her but that she felt that she had support through her family, friends and coworkers  She stated that she feels that she has been able to work through her emotions and has been utilizing healthy coping mechanisms  She stated that she has started to date and is moving forward with her life  She also feels that she has been able to maintain an even mood and feels positive about her future  Continuing to work on use of healthy coping mechanisms for stress as well as being able to recognize her triggers  Creating a treatment plan  Giving supportive therapy  A- Progress- Continuing to work towards completing her treatment goals  P-Continue treatment    [address]: Diagnosis and Treatment Plan explained to Jovanna Thompson relates understanding diagnosis and is agreeable to Treatment Plan   Yes

## 2020-02-27 NOTE — BH TREATMENT PLAN
Dayan Rivera  2000       Date of Initial Treatment Plan: 2/26/20   Date of Current Treatment Plan: 02/27/20    Treatment Plan Number 1     Strengths/Personal Resources for Self Care: Resilient, intelligent, compassionate    Diagnosis:   No diagnosis found  Area of Needs:   Moods      Long Term Goal 1: Be able to maintain my moods    Target Date:  Completion Date:          Short Term Objectives for Goal 1:        Continue medication management       Continue to set and maintain boundaries in my relationships       Use healthy coping mechanisms for stress       Don't internalize my emotions       Don't accept bad behavior from others      GOAL 1: Modality: Individual 1-2x per month   Completion Date TBD and The person(s) responsible for carrying out the plan is  Mamie psychiatrist        [address]: Diagnosis and Treatment Plan explained to Melyssa Orozco relates understanding diagnosis and is agreeable to Treatment Plan         Client Comments : Please share your thoughts, feelings, need and/or experiences regarding your treatment plan: None

## 2020-02-27 NOTE — PSYCH
Treatment Plan Tracking    # 1Treatment Plan not completed within required time limits due to: Treatmeent plan created verbally  Will be signed at her next session

## 2020-03-08 DIAGNOSIS — G43.709 CHRONIC MIGRAINE WITHOUT AURA WITHOUT STATUS MIGRAINOSUS, NOT INTRACTABLE: ICD-10-CM

## 2020-03-09 RX ORDER — TOPIRAMATE 100 MG/1
TABLET, FILM COATED ORAL
Qty: 60 TABLET | Refills: 6 | Status: SHIPPED | OUTPATIENT
Start: 2020-03-09 | End: 2020-04-27 | Stop reason: SDUPTHER

## 2020-03-25 ENCOUNTER — TELEPHONE (OUTPATIENT)
Dept: PSYCHIATRY | Facility: CLINIC | Age: 20
End: 2020-03-25

## 2020-03-25 ENCOUNTER — TELEMEDICINE (OUTPATIENT)
Dept: BEHAVIORAL/MENTAL HEALTH CLINIC | Facility: CLINIC | Age: 20
End: 2020-03-25
Payer: COMMERCIAL

## 2020-03-25 DIAGNOSIS — F39 MOOD DISORDER (HCC): Primary | ICD-10-CM

## 2020-03-25 PROCEDURE — 90834 PSYTX W PT 45 MINUTES: CPT | Performed by: SOCIAL WORKER

## 2020-03-25 NOTE — PSYCH
Virtual Regular Visit    Problem List Items Addressed This Visit     None          [unfilled]    Reason for visit is Video visit due to virus    Encounter provider Adin Hooper    Provider located at [facility]      [unfilled]     After connecting through [employer], the patient was identified by name and date of birth  Radhika Christopher was informed that this is a telemedicine visit and that the visit is being conducted through [facility] which may not be secure and therefore, might not be HIPAA-compliant  My office door was closed  No one else was in the room  She acknowledged consent and understanding of privacy and security of the video platform  The patient has agreed to participate and understands they can discontinue the visit at any time  Danilo Boggs is a 20 y o  female D- Mamie stated that she has been doing well despite the virus  She stated that 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  She stated that she floats to many different areas which has exposed her potentially to virus threats  She stated that she is also uncertain about her schooling due to one of her courses being "hands on"  Discussing ways to cope with the current life situation ad continue to utilize positive thought  Giving supportive therapy  A- Progress- Continuing to work towards completing treatment goals  P-Continue treatment        Past Medical History:   Diagnosis Date    Dysmenorrhea     Last Assessed:6/26/17    Functional ovarian cysts     Menorrhagia with regular cycle     Migraine     Ovarian cyst     Last Assessed:2/17/15       Past Surgical History:   Procedure Laterality Date    FL LUMBAR PUNCTURE  12/4/2018    TOOTH EXTRACTION         Current Outpatient Medications   Medication Sig Dispense Refill    acetaZOLAMIDE (DIAMOX) 250 mg tablet Take 3 tablets (750 mg total) by mouth 3 (three) times a day 3 tabs in am, 3 tabs mid day and 2 tabs at bedtime 270 tablet 5    Diclofenac Potassium (CAMBIA) 50 MG PACK Take 50 mg by mouth as needed (migraine) 6 each 3    doxycycline monohydrate (MONODOX) 100 mg capsule Take 1 capsule by mouth Daily      fluticasone (FLONASE) 50 mcg/act nasal spray 1 spray into each nostril once as needed       lamoTRIgine (LaMICtal) 100 mg tablet Take 100 mg by mouth daily  0    lamoTRIgine (LaMICtal) 25 mg tablet Take 50 mg by mouth daily  0    loxapine (LOXITANE) 5 mg capsule   0    norgestimate-ethinyl estradiol (ORTHO TRI-CYCLEN,TRINESSA) 0 18/0 215/0 25 MG-35 MCG per tablet Take 1 tablet by mouth daily  0    prochlorperazine (COMPAZINE) 10 mg tablet Take 1 tablet (10 mg total) by mouth every 6 (six) hours as needed for nausea (migraine) 10 tablet 0    topiramate (TOPAMAX) 100 mg tablet take 2 tablets by mouth at bedtime 60 tablet 6    tretinoin (RETIN-A) 0 025 % cream Apply topically daily as needed         No current facility-administered medications for this visit  No Known Allergies    Review of Systems    Physical Exam     I spent 45 minutes with the patient during this visit

## 2020-04-27 DIAGNOSIS — G93.2 PSEUDOTUMOR CEREBRI: ICD-10-CM

## 2020-04-27 DIAGNOSIS — G43.709 CHRONIC MIGRAINE WITHOUT AURA WITHOUT STATUS MIGRAINOSUS, NOT INTRACTABLE: ICD-10-CM

## 2020-04-27 RX ORDER — TOPIRAMATE 100 MG/1
TABLET, FILM COATED ORAL
Qty: 60 TABLET | Refills: 6 | Status: SHIPPED | OUTPATIENT
Start: 2020-04-27 | End: 2020-08-18 | Stop reason: SDUPTHER

## 2020-04-27 RX ORDER — ACETAZOLAMIDE 250 MG/1
250 TABLET ORAL 2 TIMES DAILY
Qty: 270 TABLET | Refills: 5 | Status: SHIPPED | OUTPATIENT
Start: 2020-04-27 | End: 2020-08-18 | Stop reason: SDUPTHER

## 2020-04-29 ENCOUNTER — TELEMEDICINE (OUTPATIENT)
Dept: BEHAVIORAL/MENTAL HEALTH CLINIC | Facility: CLINIC | Age: 20
End: 2020-04-29
Payer: COMMERCIAL

## 2020-04-29 DIAGNOSIS — F39 MOOD DISORDER (HCC): Primary | ICD-10-CM

## 2020-04-29 PROCEDURE — 90834 PSYTX W PT 45 MINUTES: CPT | Performed by: SOCIAL WORKER

## 2020-05-22 ENCOUNTER — TELEPHONE (OUTPATIENT)
Dept: PSYCHIATRY | Facility: CLINIC | Age: 20
End: 2020-05-22

## 2020-06-03 DIAGNOSIS — G43.709 CHRONIC MIGRAINE WITHOUT AURA WITHOUT STATUS MIGRAINOSUS, NOT INTRACTABLE: ICD-10-CM

## 2020-06-03 RX ORDER — TOPIRAMATE 100 MG/1
TABLET, FILM COATED ORAL
Qty: 60 TABLET | Refills: 6 | OUTPATIENT
Start: 2020-06-03

## 2020-06-15 ENCOUNTER — TELEPHONE (OUTPATIENT)
Dept: CARDIOLOGY CLINIC | Facility: CLINIC | Age: 20
End: 2020-06-15

## 2020-06-25 ENCOUNTER — TELEMEDICINE (OUTPATIENT)
Dept: BEHAVIORAL/MENTAL HEALTH CLINIC | Facility: CLINIC | Age: 20
End: 2020-06-25
Payer: COMMERCIAL

## 2020-06-25 DIAGNOSIS — F39 MOOD DISORDER (HCC): Primary | ICD-10-CM

## 2020-06-25 PROCEDURE — 90832 PSYTX W PT 30 MINUTES: CPT | Performed by: SOCIAL WORKER

## 2020-08-14 ENCOUNTER — TELEPHONE (OUTPATIENT)
Dept: BEHAVIORAL/MENTAL HEALTH CLINIC | Facility: CLINIC | Age: 20
End: 2020-08-14

## 2020-08-18 DIAGNOSIS — G93.2 PSEUDOTUMOR CEREBRI: ICD-10-CM

## 2020-08-18 DIAGNOSIS — G43.709 CHRONIC MIGRAINE WITHOUT AURA WITHOUT STATUS MIGRAINOSUS, NOT INTRACTABLE: ICD-10-CM

## 2020-08-18 RX ORDER — ACETAZOLAMIDE 250 MG/1
500 TABLET ORAL 2 TIMES DAILY
Qty: 120 TABLET | Refills: 5 | Status: SHIPPED | OUTPATIENT
Start: 2020-08-18 | End: 2021-03-17 | Stop reason: SDUPTHER

## 2020-08-18 RX ORDER — TOPIRAMATE 100 MG/1
TABLET, FILM COATED ORAL
Qty: 60 TABLET | Refills: 5 | Status: SHIPPED | OUTPATIENT
Start: 2020-08-18 | End: 2021-02-08

## 2020-08-18 NOTE — TELEPHONE ENCOUNTER
Pt's mom called for refills of topiramate and diamox  I made her aware that these were sent to mail order pharm in April  She states that she does not use [facility]  I also see that pt called in for refill in June but it was noted that pt had not been seen in over a year and needed to make an appt  I scheduled pt to see you 10/20  Scripts entered for [address] [place] [address]    Please sign off

## 2020-10-06 ENCOUNTER — TELEPHONE (OUTPATIENT)
Dept: NEUROLOGY | Facility: CLINIC | Age: 20
End: 2020-10-06

## 2020-10-12 ENCOUNTER — TELEPHONE (OUTPATIENT)
Dept: NEUROLOGY | Facility: CLINIC | Age: 20
End: 2020-10-12

## 2021-02-03 ENCOUNTER — TELEPHONE (OUTPATIENT)
Dept: FAMILY MEDICINE CLINIC | Facility: CLINIC | Age: 21
End: 2021-02-03

## 2021-02-03 DIAGNOSIS — H10.33 ACUTE CONJUNCTIVITIS OF BOTH EYES, UNSPECIFIED ACUTE CONJUNCTIVITIS TYPE: Primary | ICD-10-CM

## 2021-02-03 RX ORDER — POLYMYXIN B SULFATE AND TRIMETHOPRIM 1; 10000 MG/ML; [USP'U]/ML
1 SOLUTION OPHTHALMIC EVERY 6 HOURS
Qty: 10 ML | Refills: 0 | Status: SHIPPED | OUTPATIENT
Start: 2021-02-03 | End: 2021-02-10

## 2021-02-03 NOTE — TELEPHONE ENCOUNTER
Patient called and stated that she lives an hour a way so she isn't able to come in for a visit right now, but she has had Pink eye for a week, its itchy and watery and she wanted to know if you can send something in  Patient was advised that you haven't seen her in over a year but she insisted that I ask you?      Pharmacy is [facility]

## 2021-02-06 DIAGNOSIS — G43.709 CHRONIC MIGRAINE WITHOUT AURA WITHOUT STATUS MIGRAINOSUS, NOT INTRACTABLE: ICD-10-CM

## 2021-02-08 RX ORDER — TOPIRAMATE 100 MG/1
TABLET, FILM COATED ORAL
Qty: 60 TABLET | Refills: 5 | Status: SHIPPED | OUTPATIENT
Start: 2021-02-08

## 2021-03-05 ENCOUNTER — TELEPHONE (OUTPATIENT)
Dept: NEUROLOGY | Facility: CLINIC | Age: 21
End: 2021-03-05

## 2021-03-05 NOTE — TELEPHONE ENCOUNTER
Pt detailed message for pt regarding below    Asked her to call our office back if she would like to do LP

## 2021-03-05 NOTE — TELEPHONE ENCOUNTER
pt's mom called and states that pt has been off her meds since nov due to insurance issues  she states that she is having atleast 4 migrianes a week and the last time this happened she had to have an LP   states that she is missing school  pt scheduled to see you on 3/29  pt is taking topiramate, she is unsure what dose  last prescribed in feb for 100mg 2 tabs hs  she states that she was previously told that we would not fill meds until she was seen  states that she was previously taking diamox but has not taken it since nov   also was using cambia powder previously and this was effective for her      Any recommendations  [phone] to leave detailed message

## 2021-03-05 NOTE — TELEPHONE ENCOUNTER
I can order an outpatient LP if she has insurance now  In addition she needs to schedule an ophthalmology appointment  We can discuss her medications at the visit

## 2021-03-08 ENCOUNTER — PREP FOR PROCEDURE (OUTPATIENT)
Dept: NEUROLOGY | Facility: CLINIC | Age: 21
End: 2021-03-08

## 2021-03-08 DIAGNOSIS — G93.2 INTRACRANIAL HYPERTENSION: Primary | ICD-10-CM

## 2021-03-08 NOTE — TELEPHONE ENCOUNTER
Pt called back  She would like to do LP     Gave her CS phone number to call once order is placed  She will be calling to scheduled an appt with ophthalmology   Please advise  [phone] to leave detailed message

## 2021-03-09 NOTE — NURSING NOTE
Called patient to complete consult and go over instructions, patient is scheduled on 3/16/21 for Theraputic lumbar puncture  Verified allergies and no current anticoagulant medication present   Patient has had this procedure in the recent past, asked if patient had any questions or concerns patient denied  Reminded patient of location, time and date of procedure, of location and time ambulatory procedure unit  Patient made aware that she may call if any other questions that may arise to the myself

## 2021-03-16 ENCOUNTER — HOSPITAL ENCOUNTER (OUTPATIENT)
Dept: RADIOLOGY | Facility: HOSPITAL | Age: 21
Discharge: HOME | End: 2021-03-16
Admitting: PHYSICIAN ASSISTANT
Payer: COMMERCIAL

## 2021-03-16 VITALS
WEIGHT: 215 LBS | DIASTOLIC BLOOD PRESSURE: 60 MMHG | HEART RATE: 62 BPM | HEIGHT: 64 IN | SYSTOLIC BLOOD PRESSURE: 102 MMHG | RESPIRATION RATE: 20 BRPM | BODY MASS INDEX: 36.7 KG/M2 | TEMPERATURE: 98 F | OXYGEN SATURATION: 98 %

## 2021-03-16 DIAGNOSIS — G93.2 INTRACRANIAL HYPERTENSION: ICD-10-CM

## 2021-03-16 LAB
EXT PREGNANCY TEST URINE: NEGATIVE
EXT. CONTROL: NORMAL
INR PPP: 1 (ref 0.84–1.19)
PLATELET # BLD AUTO: 374 THOUSANDS/UL (ref 149–390)
PMV BLD AUTO: 9 FL (ref 8.9–12.7)
PROTHROMBIN TIME: 13.2 SECONDS (ref 11.6–14.5)

## 2021-03-16 PROCEDURE — 85610 PROTHROMBIN TIME: CPT | Performed by: PHYSICIAN ASSISTANT

## 2021-03-16 PROCEDURE — 62328 DX LMBR SPI PNXR W/FLUOR/CT: CPT

## 2021-03-16 PROCEDURE — 85049 AUTOMATED PLATELET COUNT: CPT | Performed by: PHYSICIAN ASSISTANT

## 2021-03-16 PROCEDURE — 81025 URINE PREGNANCY TEST: CPT | Performed by: PHYSICIAN ASSISTANT

## 2021-03-16 RX ORDER — ACETAMINOPHEN 325 MG/1
650 TABLET ORAL EVERY 6 HOURS PRN
Status: DISCONTINUED | OUTPATIENT
Start: 2021-03-16 | End: 2021-03-17 | Stop reason: HOSPADM

## 2021-03-16 RX ORDER — ESCITALOPRAM OXALATE 5 MG/1
TABLET ORAL
COMMUNITY
Start: 2021-01-07

## 2021-03-16 RX ORDER — ALPRAZOLAM 1 MG/1
1 TABLET ORAL AS NEEDED
COMMUNITY

## 2021-03-16 RX ORDER — PERPHENAZINE 8 MG
TABLET ORAL
COMMUNITY
Start: 2021-01-08

## 2021-03-16 NOTE — DISCHARGE INSTRUCTIONS
Lumbar Puncture   WHAT YOU NEED TO KNOW:   A lumbar puncture is a procedure used to collect cerebrospinal fluid (CSF)  CSF is a clear, protective fluid that flows around the brain and inside the spinal canal  A lumbar puncture is usually done to check for an infection, inflammation, bleeding, or other conditions that affect the brain  It may also be done to remove CSF to reduce pressure in the brain  DISCHARGE INSTRUCTIONS:   Seek care immediately if:   · You have a severe headache that does not get better after you lie down  · You have a fever  · You have a stiff neck or trouble thinking clearly  · Your legs, feet, or other parts below the waist feel numb, tingly, or weak  · You have bleeding or a discharge coming from the area where the needle was put into your back  · You have severe pain in your back or neck  Call your doctor if:   · You have questions or concerns about your condition or care  Medicines: You may need any of the following:  · Acetaminophen  decreases pain and fever  It is available without a doctor's order  Ask how much to take and how often to take it  Follow directions  Read the labels of all other medicines you are using to see if they also contain acetaminophen, or ask your doctor or pharmacist  Acetaminophen can cause liver damage if not taken correctly  Do not use more than 4 grams (4,000 milligrams) total of acetaminophen in one day  · NSAIDs  help decrease swelling and pain or fever  This medicine is available with or without a doctor's order  NSAIDs can cause stomach bleeding or kidney problems in certain people  If you take blood thinner medicine, always ask your healthcare provider if NSAIDs are safe for you  Always read the medicine label and follow directions  · Prescription pain medicine  may be given  Ask your healthcare provider how to take this medicine safely  Some prescription pain medicines contain acetaminophen   Do not take other medicines that contain acetaminophen without talking to your healthcare provider  Too much acetaminophen may cause liver damage  Prescription pain medicine may cause constipation  Ask your healthcare provider how to prevent or treat constipation  · Take your medicine as directed  Contact your healthcare provider if you think your medicine is not helping or if you have side effects  Tell him or her if you are allergic to any medicine  Keep a list of the medicines, vitamins, and herbs you take  Include the amounts, and when and why you take them  Bring the list or the pill bottles to follow-up visits  Carry your medicine list with you in case of an emergency  Care for a post-lumbar puncture headache: You may develop a headache during the first few hours after your procedure that may last for several days  The headache may be mild to severe and may get worse when you sit or stand  The following may help ease a post-lumbar puncture headache:  · Drink more liquid  than usual after your lumbar puncture  Ask how much liquid is right for you  Caffeine may be used to treat a headache  Drinks such as coffee, tea, or some soft drinks have caffeine  Caffeine is also available over the counter in tablet form  Ask about using caffeine to treat your headache  Do not drink alcohol  Alcohol can make your headache worse  · Lie down  or rest to ease your headache pain  Follow up with your healthcare provider as directed:  Write down your questions so you remember to ask them during your visits  © Copyright [address] Information is for End User's use only and may not be sold, redistributed or otherwise used for commercial purposes  All illustrations and images included in CareNotes® are the copyrighted property of A D A M , Inc  or [facility] Cortez Riggins   The above information is an  only  It is not intended as medical advice for individual conditions or treatments   Talk to your doctor, nurse or pharmacist before following any medical regimen to see if it is safe and effective for you

## 2021-03-17 DIAGNOSIS — G93.2 PSEUDOTUMOR CEREBRI: ICD-10-CM

## 2021-03-17 RX ORDER — ACETAZOLAMIDE 250 MG/1
500 TABLET ORAL 2 TIMES DAILY
Qty: 120 TABLET | Refills: 5 | Status: SHIPPED | OUTPATIENT
Start: 2021-03-17 | End: 2021-03-29 | Stop reason: SDUPTHER

## 2021-03-19 ENCOUNTER — TELEPHONE (OUTPATIENT)
Dept: RADIOLOGY | Facility: HOSPITAL | Age: 21
End: 2021-03-19

## 2021-03-19 ENCOUNTER — TRANSCRIBE ORDERS (OUTPATIENT)
Dept: ADMINISTRATIVE | Facility: HOSPITAL | Age: 21
End: 2021-03-19

## 2021-03-19 ENCOUNTER — TELEPHONE (OUTPATIENT)
Dept: NEUROLOGY | Facility: CLINIC | Age: 21
End: 2021-03-19

## 2021-03-19 ENCOUNTER — HOSPITAL ENCOUNTER (OUTPATIENT)
Dept: RADIOLOGY | Facility: HOSPITAL | Age: 21
Discharge: HOME | End: 2021-03-19
Admitting: RADIOLOGY
Payer: COMMERCIAL

## 2021-03-19 ENCOUNTER — PREP FOR PROCEDURE (OUTPATIENT)
Dept: NEUROLOGY | Facility: CLINIC | Age: 21
End: 2021-03-19

## 2021-03-19 DIAGNOSIS — G97.0 CEREBROSPINAL FLUID LEAK FROM SPINAL PUNCTURE: Primary | ICD-10-CM

## 2021-03-19 DIAGNOSIS — G97.0 CSF LEAK AT LP SITE: Primary | ICD-10-CM

## 2021-03-19 DIAGNOSIS — G97.0 CEREBROSPINAL FLUID LEAK FROM SPINAL PUNCTURE: ICD-10-CM

## 2021-03-19 PROCEDURE — 62273 INJECT EPIDURAL PATCH: CPT

## 2021-03-19 PROCEDURE — 77012 CT SCAN FOR NEEDLE BIOPSY: CPT

## 2021-03-19 NOTE — TELEPHONE ENCOUNTER
spoke to pt   she states that it is better with laying down  when she starts to sit up or stand up feels a head rush and gets dizzy and pounding headahce      [phone] to leave detailed message

## 2021-03-19 NOTE — TELEPHONE ENCOUNTER
Pt called regarding blood patch    Made her aware of below and that I would call her back as soon as possible

## 2021-03-19 NOTE — TELEPHONE ENCOUNTER
Called IR but had to leave a vm  Made them aware that order was placed and to call office if anything else is needed

## 2021-03-19 NOTE — TELEPHONE ENCOUNTER
Received message from IR scheduling  Pt called to schedule blood patch  They are asking for a referral and or an order be placed in Epic        Teams message sent to Serge

## 2021-03-19 NOTE — TELEPHONE ENCOUNTER
IR called and states that blood patches are not done in IR  she would need to call radiology dept at [phone] to schedule blood patch  Called this number and was told to call CS    Called CS and this would be scheduled through procedure scheduling  Spoke to someone in procedure scheduling and blood patch would need to be coordinated through Radiology nurse     She will call me back

## 2021-03-19 NOTE — TELEPHONE ENCOUNTER
I received a call from Kirby Leonardo in scheduling asking to schedule a blood patch for Ms Maddy Gibson presented to [facility] on 3/16/2021 for a lumbar puncture due to acute migraines and a prior history of intracranial hypertension  The patient had an uneventful lumbar puncture  Please see separate procedure note from 3/16/2021  Her opening pressure was found to be 27 cmH20  27ml of clear colorless CSF was removed and a closing pressure was 12cm H20  The patient contacted neurology yesterday stating she had a headache that was worse when she sat upright  I called June  She was tearful on the phone  She reports a severe headache that is significantly worsen with sitting upright and standing  She denies any fevers, chills, skin erythema, or additional complaints  She notes this headache is different than the headaches she was experiencing prior to her lumbar puncture  I spoke with Dr Trey Taylor  We will schedule June to undergo a CT guided blood patch this afternoon  Please feel free to contact us with any questions or concerns       Ella Goodwin [facility]

## 2021-03-19 NOTE — TELEPHONE ENCOUNTER
Called procedure scheduling, she states that pt is scheduled for Today 3:30pm at [facility]  They will be putting an order in for feliz to sign  TT sent to [facility] pt to make sure she was aware of above  Pt spoke to Malia with procedure scheduling and aware of appt

## 2021-03-19 NOTE — TELEPHONE ENCOUNTER
Please call to check on patient today  If better with laying down, may need a blood patch    ----- Message from Evan Mercer RN sent at 3/19/2021  7:58 AM EDT -----  Regarding: FW: Visit Follow-Up Question  Contact: [phone]    ----- Message -----  From: Claudetta Glimpse  Sent: 3/18/2021   9:09 PM EDT  To: Neurology [facility] Clinical Team 5  Subject: Visit Follow-Up Question                         Williams Smart, yesterday morning when you called I was feeling good! But in the afternoon it hit me pretty hard and even today I have a terrible migraine  I've been trying to get up and do something's but I just feel super sick and my head is pounding

## 2021-03-20 ENCOUNTER — TELEPHONE (OUTPATIENT)
Dept: RADIOLOGY | Facility: HOSPITAL | Age: 21
End: 2021-03-20

## 2021-03-20 NOTE — TELEPHONE ENCOUNTER
Left a message this morning to check to see how Ramos Hoffmann is feeling s/p CT guided blood patch from yesterday 3/19/2021

## 2021-03-25 ENCOUNTER — TRANSCRIBE ORDERS (OUTPATIENT)
Dept: NEUROLOGY | Facility: CLINIC | Age: 21
End: 2021-03-25

## 2021-03-25 DIAGNOSIS — G43.709 CHRONIC MIGRAINE WITHOUT AURA, NOT INTRACTABLE, WITHOUT STATUS MIGRAINOSUS: Primary | ICD-10-CM

## 2021-03-26 ENCOUNTER — TELEPHONE (OUTPATIENT)
Dept: NEUROLOGY | Facility: CLINIC | Age: 21
End: 2021-03-26

## 2021-03-26 NOTE — TELEPHONE ENCOUNTER
Called patient and I made her aware of the change in her appointment time 03/29/21 from 08:30am to 08:15am  She stated that she has school at 9 so I changed her over to a virtual video and added her to teams

## 2021-03-29 ENCOUNTER — TELEMEDICINE (OUTPATIENT)
Dept: NEUROLOGY | Facility: CLINIC | Age: 21
End: 2021-03-29
Payer: COMMERCIAL

## 2021-03-29 DIAGNOSIS — G43.709 CHRONIC MIGRAINE WITHOUT AURA, NOT INTRACTABLE, WITHOUT STATUS MIGRAINOSUS: ICD-10-CM

## 2021-03-29 DIAGNOSIS — F39 MOOD DISORDER (HCC): ICD-10-CM

## 2021-03-29 DIAGNOSIS — G93.2 INTRACRANIAL HYPERTENSION: Primary | ICD-10-CM

## 2021-03-29 DIAGNOSIS — G43.709 CHRONIC MIGRAINE WITHOUT AURA WITHOUT STATUS MIGRAINOSUS, NOT INTRACTABLE: ICD-10-CM

## 2021-03-29 DIAGNOSIS — G93.2 PSEUDOTUMOR CEREBRI: ICD-10-CM

## 2021-03-29 PROCEDURE — 99214 OFFICE O/P EST MOD 30 MIN: CPT | Performed by: PHYSICIAN ASSISTANT

## 2021-03-29 RX ORDER — INDOMETHACIN 50 MG/1
50 CAPSULE ORAL 2 TIMES DAILY PRN
Qty: 20 CAPSULE | Refills: 3 | Status: SHIPPED | OUTPATIENT
Start: 2021-03-29

## 2021-03-29 RX ORDER — ESCITALOPRAM OXALATE 10 MG/1
10 TABLET ORAL DAILY
COMMUNITY

## 2021-03-29 RX ORDER — SUCRALFATE 1 G/1
1 TABLET ORAL 2 TIMES DAILY PRN
Qty: 20 TABLET | Refills: 3 | Status: SHIPPED | OUTPATIENT
Start: 2021-03-29

## 2021-03-29 RX ORDER — ACETAZOLAMIDE 250 MG/1
500 TABLET ORAL 3 TIMES DAILY
Qty: 180 TABLET | Refills: 5 | Status: SHIPPED | OUTPATIENT
Start: 2021-03-29

## 2021-03-29 NOTE — ASSESSMENT & PLAN NOTE
Acetazolamide 500 mg TID  If no improvement will need to increase again  To abort a headache take  Sucralfate 20-30 minutes prior to indomethacin 50 mg  May repeat every 8 hours as needed    Limit of 20 in a month

## 2021-03-29 NOTE — PATIENT INSTRUCTIONS
Intracranial hypertension  Acetazolamide 500 mg TID  If no improvement will need to increase again  To abort a headache take  Sucralfate 20-30 minutes prior to indomethacin 50 mg  May repeat every 8 hours as needed  Limit of 20 in a month    Chronic migraine headaches:   Preventive:   - Lamictal 100 mm as directed by outside MD  - Cyproheptadine for weather headaches  You take this before bed to prevent a headache the next day  Use as needed  - Topamax 200 mg at bedtime     Abortive therapy:   - cambia at onset of migraine  May also use prochlorperazine 10 mg at onset of migraine  Call if unable to abort  Neck pain:   - We discussed the role of neck pathology and poor posture, with straightening of the normal cervical lordosis, in headaches  We discussed how tightening of the neck muscles can irritate the nerves in the occipital region of her head and cause or worsen head pain  We also discussed and demonstrated neck strengthening and relaxation exercises, as well as giving written instructions on these exercises  - We talked about the importance of good posture for improving shoulder, neck, and head pain  The patient was given visualization exercises for correcting posture, which patient will practice at home  If this simple exercise does not help improve the posture, we will consider formal physical therapy in the future  Medication overuse headaches:   - We discussed medication overuse headache [facility]) and how to avoid it in the future  It was explained that all analgesics have the potential to cause medication overuse headache [facility]) and analgesic overuse can negate the effectiveness of headache preventive measures  After successful [address] treatment, preventive medications for an underlying primary headache disorder have a greater chance for success   Avoid medications with narcotics, barbiturates, or caffeine in them as these can cause rebound headaches after very few doses and can interfere with other headache medicine efficacy  Taking any analgesics for more than 2-3 days a week can cause medication overuse headache  Reproductive age women: Should take folic acid daily when taking anti-seizure drugs especially Depakote  [place] Prescription Drug Monitoring Program report was reviewed and was appropriate      Headache management instructions  - When patient has a moderate to severe headache, they should seek rest, initiate relaxation and apply cold compresses to the head  - Maintain regular sleep schedule  Adults need at least 7-8 hours of uninterrupted a night  - Limit over the counter medications such as Tylenol, Ibuprofen, Aleve, Excedrin  (No more than 3 times a week)  - Maintain headache diary  We discussed an MAG for a smart phone is "Migraine renetta"  - Limit caffeine to 1-2 cups 8 to 16 oz a day or less  - Avoid dietary trigger  (aged cheese, peanuts, MSG, aspartame and nitrates)  - Patient is to have regular frequent meals to prevent headache onset  - Please drink at least 64 ounces of water a day to help remain hydrated  Please call with any questions or concerns   Office number is [phone]

## 2021-03-29 NOTE — PROGRESS NOTES
Virtual Regular Visit      Assessment/Plan:    Problem List Items Addressed This Visit        Cardiovascular and Mediastinum    Chronic migraine without aura without status migrainosus, not intractable     Preventive:   - Lamictal 100 mm as directed by outside MD  - Cyproheptadine for weather headaches  You take this before bed to prevent a headache the next day  Use as needed  - Topamax 200 mg at bedtime     Abortive therapy:   - cambia at onset of migraine  May also use prochlorperazine 10 mg at onset of migraine  Call if unable to abort  Relevant Medications    escitalopram (LEXAPRO) 10 mg tablet    indomethacin (INDOCIN) 50 mg capsule       Nervous and Auditory    Pseudotumor cerebri    Relevant Medications    indomethacin (INDOCIN) 50 mg capsule    sucralfate (CARAFATE) 1 g tablet    acetaZOLAMIDE (DIAMOX) 250 mg tablet       Other    Intracranial hypertension - Primary     Acetazolamide 500 mg TID  If no improvement will need to increase again  To abort a headache take  Sucralfate 20-30 minutes prior to indomethacin 50 mg  May repeat every 8 hours as needed    Limit of 20 in a month         Mood disorder (HCC)    Relevant Medications    escitalopram (LEXAPRO) 10 mg tablet      Other Visit Diagnoses     Chronic migraine without aura, not intractable, without status migrainosus        Relevant Medications    escitalopram (LEXAPRO) 10 mg tablet    indomethacin (INDOCIN) 50 mg capsule    sucralfate (CARAFATE) 1 g tablet               Reason for visit is   Chief Complaint   Patient presents with    Migraine    Virtual Regular Visit        Encounter provider Lynette Geiger PA-C    Provider located at [place] [address]  [phone]      Recent Visits  Date Type Provider Dept   03/26/21 Telephone Vianney Zavaleta, [address] [place] [address] recent visits within past 7 days and meeting all other requirements     Today's Visits  Date Type Provider Dept   03/29/21 Telemedicine Shelly Pretty PA-C [facility] today's visits and meeting all other requirements     Future Appointments  No visits were found meeting these conditions  Showing future appointments within next 150 days and meeting all other requirements        The patient was identified by name and date of birth  Ramirez Beverage was informed that this is a telemedicine visit and that the visit is being conducted through [employer] and patient was informed that this is a secure, HIPAA-compliant platform  She agrees to proceed     My office door was closed  No one else was in the room  She acknowledged consent and understanding of privacy and security of the video platform  The patient has agreed to participate and understands they can discontinue the visit at any time  Patient is aware this is a billable service  Tari Noel is a 23 y o  female  Patient is at [employer] and is scheduled to graduate in May 2021     Intracranial hypertension:   Lumbar puncture:   - 2/10/16 OP was 20   - 12/4/ 2018 Opening pressure 29 cm of H2O sustained and pulsatile  Closing pressure 15 5 cm of H2O   03/16/2021 opening pressure was 27 cm H2O   closing pressure 12 cm of H20    Chronic migraine headaches:   What medications do you take or have you taken for your headaches?    PREVENTIVE:   Topamax (which she does not think it really helped), Protriptyline, Propranolol, diamox, Lamictal   ABORTIVE:   Motrin which does help, has tried Excedrin, Tylenol, Reglan, Toradol, cambia, indomethacin, ibuprofen (almost daily)     Headache trigger: Stress, Menstruation, Sunlight, Loud noises   Alternative therapies used in the past for headaches? none   Headache are worse if the patient: cough, sneeze, bending over   Aura/warning and how long does it last - none     Current pain level- 0/10   How often do the headaches occur - 4 a week   What time of the day do the headaches start - sometime she wakes up with them or by mid day she will get it  How long do the headaches last - usually all day   Where are they located - bilateral temporal, bilateral occipital   What is the intensity of pain - average 7-8/10, up to 8/10   Describe your usual headache - Throbbing, Pressure, Aching     Associated symptoms:   - Blurry vision   - Nausea   - Photophobia, phonophobia   - flushing of face bilaterally   - light-headed or dizzy, stiff or sore neck,   - prefer to be alone and in a dark room     Number of days missed per month because of headaches:   Work (or school) days: 1 a week   Social or Family activities: 0-1     What time of the year do headaches occur more frequently?   summer   Have you seen someone else for headaches or pain? Yes, when young   Have you had trigger point injection performed and how often? No   Have you had Botox injection performed and how often? No   Have you had epidural injections or transforaminal injections performed? No   Have you used CBD or THC for your headaches and how often? Yes, has medical card, and does feel it helps  Are you current pregnant or planning on getting pregnant? No   Have you ever had any Brain imaging? yes   1/08/2016 MRI head without contrast:   Given headaches in the context of slit-like appearance of the ventricles and patient's BMI of 29, consideration should be given to early idiopathic intracranial hypertension  Non-specific single small left anterior frontal focus of T2 signal hyperintensity  Small bilateral maxillary sinus mucosal retention cysts  Personally reviewed her MRI head                Past Medical History:   Diagnosis Date    Dysmenorrhea     Last Assessed:6/26/17    Functional ovarian cysts     Menorrhagia with regular cycle     Migraine     Ovarian cyst     Last Assessed:2/17/15       Past Surgical History:   Procedure Laterality Date    CT NEEDLE BX ASPIRATION INJECTION LOCALIZATION  3/19/2021    FL LUMBAR PUNCTURE DIAGNOSTIC  12/4/2018    FL LUMBAR PUNCTURE DIAGNOSTIC  3/16/2021    TOOTH EXTRACTION         Current Outpatient Medications   Medication Sig Dispense Refill    acetaZOLAMIDE (DIAMOX) 250 mg tablet Take 2 tablets (500 mg total) by mouth 3 (three) times a day 180 tablet 5    ALPRAZolam (XANAX) 1 mg tablet Take 1 mg by mouth as needed       Diclofenac Potassium (CAMBIA) 50 MG PACK Take 50 mg by mouth as needed (migraine) 6 each 3    escitalopram (LEXAPRO) 10 mg tablet Take 10 mg by mouth daily Take along with a 5 mg tablet      escitalopram (LEXAPRO) 5 mg tablet take 1 tablet by mouth once daily WITH 10MG TABLETS      fluticasone (FLONASE) 50 mcg/act nasal spray 1 spray into each nostril once as needed       lamoTRIgine (LaMICtal) 100 mg tablet Take 100 mg by mouth daily  0    perphenazine 8 mg tablet take 1 tablet by mouth nightly IN PLACE OF 2MG TABS      prochlorperazine (COMPAZINE) 10 mg tablet Take 1 tablet (10 mg total) by mouth every 6 (six) hours as needed for nausea (migraine) 10 tablet 0    topiramate (TOPAMAX) 100 mg tablet take 2 tablets by mouth at bedtime 60 tablet 5    indomethacin (INDOCIN) 50 mg capsule Take 1 capsule (50 mg total) by mouth 2 (two) times a day as needed for mild pain 20 capsule 3    norgestimate-ethinyl estradiol (ORTHO TRI-CYCLEN,TRINESSA) 0 18/0 215/0 25 MG-35 MCG per tablet Take 1 tablet by mouth daily  0    sucralfate (CARAFATE) 1 g tablet Take 1 tablet (1 g total) by mouth 2 (two) times a day as needed (headache) 20 tablet 3    tretinoin (RETIN-A) 0 025 % cream Apply 1 application topically daily as needed        No current facility-administered medications for this visit  No Known Allergies   I have reviewed the patient's medical, social and surgical history as well as medications in detail and updated the computerized patient record  Review of Systems   Constitutional: Negative      HENT: Negative  Eyes: Negative  Respiratory: Negative  Cardiovascular: Negative  Gastrointestinal: Negative  Endocrine: Negative  Genitourinary: Negative  Musculoskeletal: Negative  Skin: Negative  Allergic/Immunologic: Negative  Neurological: Positive for headaches  Hematological: Negative  Psychiatric/Behavioral: Negative  I personally reviewed and updated the ROS that was entered by the medical assistant        Video Exam    There were no vitals filed for this visit  Physical Exam   CONSTITUTIONAL: Well developed, well nourished, well groomed  No dysmorphic features  Eyes:  EOM normal      Neck:  Normal ROM, neck supple  HEENT:  Normocephalic atraumatic  Chest:  Respirations regular and unlabored  Psychiatric:  Normal behavior and appropriate affect      MENTAL STATUS  Orientation: Alert and oriented x 3  Fund of knowledge: Intact  I spent 15 minutes with patient today in which greater than 50% of the time was spent in counseling/coordination of care regarding as above and 15 minutes of non-face to face time      [address] [place] acknowledges that she has consented to an online visit or consultation  She understands that the online visit is based solely on information provided by her, and that, in the absence of a face-to-face physical evaluation by the physician, the diagnosis she receives is both limited and provisional in terms of accuracy and completeness  This is not intended to replace a full medical face-to-face evaluation by the physician  Willy Johnson understands and accepts these terms

## 2021-03-29 NOTE — ASSESSMENT & PLAN NOTE
Preventive:   - Lamictal 100 mm as directed by outside MD  - Cyproheptadine for weather headaches  You take this before bed to prevent a headache the next day  Use as needed  - Topamax 200 mg at bedtime     Abortive therapy:   - cambia at onset of migraine  May also use prochlorperazine 10 mg at onset of migraine  Call if unable to abort

## 2021-04-27 ENCOUNTER — TELEPHONE (OUTPATIENT)
Dept: NEUROLOGY | Facility: CLINIC | Age: 21
End: 2021-04-27

## 2021-04-27 NOTE — TELEPHONE ENCOUNTER
Excuse note completed  I left detailed message for patient that it is in her "my chart", please call back if she needs it faxed as well

## 2021-04-27 NOTE — TELEPHONE ENCOUNTER
Please write her a note for school allowing her to miss 1 days every week if necessary  Thanks      ----- Message from Saige Joshi RN sent at 4/27/2021 10:07 AM EDT -----  Regarding: FW: Non-Urgent Medical Question  Contact: [phone]    ----- Message -----  From: Melvin Nieto  Sent: 4/27/2021   9:49 AM EDT  To: Neurology [address] Clinical Team 5  Subject: RE: Non-Urgent Medical Question                  When I didn't have my medicine it was like 1-2 times a week now it's like once every two weeks  ----- Message -----  From: Priscilla Clarke PA-C  Sent: 4/27/21, 9:28 AM  To: Melvin Nieto  Subject: RE: Non-Urgent Medical Question    How many days are you missing so that I can write you an appropriate note  Melissa Kemp      ----- Message -----       From:June Brown       Sent:4/26/2021 10:36 PM EDT         Santi Mar PA-C    Subject:Non-Urgent Medical Question    Adrianne Flaherty, I was wondering if you or doctor Romulo Martínez could write me a general letter for my school explaining how and why I missed school or if I do miss school cause of my migraines  They are giving me a hard time about my attendance and I need something for my records

## 2021-08-17 NOTE — PSYCH
Progress Note  Psychotherapy Provided [facility]: Individual Psychotherapy 45 minutes provided today  Goals addressed in session:   Goals: 2  DBc Hatch stated that she continues to experience issues with her family  She stated that they are still adjusting to her mother's 's children living in the home  In addition, she stated that she had recent issues with her brother regarding use of the car  discussing ways for her to be able to communicate her emotions as well as assert herself when appropriate  Also continuing to work on ways to reduce stress and anxiety  Giving supportive therapy  A- progress - Continuing to work on reducing stress and anxiety in her life  P- Continue treatment       Pain Scale and Suicide Risk [facility]: On a scale of 0 to 10, the patient rates current pain at 2   Behavioral Health Treatment Plan [facility]: Diagnosis and Treatment Plan explained to patient, patient relates understanding diagnosis and is agreeable to Treatment Plan  Assessment    1   Adjustment disorder with depressed mood (309 0) (F43 21)    Signatures   Electronically signed by : Abdulaziz Fonseca LCSW; Aug 23 2016 10:30AM EST                       (Author)
Treatment Plan Tracking      #2 Treatment Plan not completed within required time limits due to: Client presented with emotional/behavioral issues that required clinical intervention          Signatures   Electronically signed by : Shelby Chavez LCSW; Aug 23 2016 10:31AM EST                       (Author)
abdominal pain

## 2022-06-08 ENCOUNTER — TELEPHONE (OUTPATIENT)
Dept: NEUROLOGY | Facility: CLINIC | Age: 22
End: 2022-06-08

## 2022-06-08 NOTE — TELEPHONE ENCOUNTER
Pt left a message today at 1:21 pm  She was a pt of Priscilla Titus and Dr Jodell Crigler  She moved to [place]  She is in process of looking for new neurologist  Requesting refill of her migraine meds   [phone]    Called pt c/o worsening migraine  Started a week ago  +nausious, sensitivity to light and sound  She took excedrin migraines, tylenol es and motrin/ibuprofen but nothing is helping  She was suppose to take prescription meds for her migraines but since she moved last Aug, she has not taken any  of her meds-topamax 100 mg 2 tabs at bedtime, Diamox 250 mg 2 tabs tid, Cambia prn as ordered for >6 months  The reason for this was bec she does not have med ins  Her new ins will starts on 7/1/22  Advised to go to the ER d/t worsening migraines  She verbalized understanding  She will go to the ED after work  Any other recommendation?       Clinical team:  If no other recommendation, no need to call pt          [phone] ok to leave detailed message

## 2022-06-09 NOTE — TELEPHONE ENCOUNTER
If she has been off of all medication since August (almost 1 year) and she is not currently following with the practice because she moved, I would be hesitant to restart anything at this point  I agree, if her migraine is severe she can be seen in the ER  She should schedule to [facility]

## 2022-06-15 NOTE — TELEPHONE ENCOUNTER
Did she go to the ER? Any vision issues other than photophobia? She does have a history of elevated intracranial pressure so she absolutely needs a neurologist where she is    Also needs to see an ophthalmologist once she gets her insurance as a priority!!

## 2022-06-15 NOTE — TELEPHONE ENCOUNTER
Pt returned call  Informed her of Charlotte's recommendations  Pt says that she has been hesitant to establish care with a new provider because she's only ever seen our providers  Pt also informs that she does not currently have insurance but new ins will start July 1  Pt states that she is still suffering from migraine  Asking if Nehemiah Cisneros can address if she is now available  Nehemiah Cisneros - See previous  Any recommendations? Please advise  Best eric [phone], ok to leave detailed message

## 2022-06-16 NOTE — TELEPHONE ENCOUNTER
Called and advised pt of all of the below  She verbalized understanding  States that she did not go to the ER  Her migraines comes and goes  Denies other vision issues

## 2022-06-16 NOTE — TELEPHONE ENCOUNTER
She does need to establish with both ophthalmology and neurology  Ophthalmology can get her in sooner and they can assess how urgently needs to restart her medication based on dilated eye examination  If her migraine returns and she is having issues would recommend ER until establishes with providers in [place]